# Patient Record
Sex: FEMALE | URBAN - METROPOLITAN AREA
[De-identification: names, ages, dates, MRNs, and addresses within clinical notes are randomized per-mention and may not be internally consistent; named-entity substitution may affect disease eponyms.]

---

## 2023-06-21 ENCOUNTER — APPOINTMENT (RX ONLY)
Dept: URBAN - METROPOLITAN AREA CLINIC 81 | Facility: CLINIC | Age: 28
Setting detail: DERMATOLOGY
End: 2023-06-21

## 2023-06-21 DIAGNOSIS — L40.0 PSORIASIS VULGARIS: ICD-10-CM | Status: INADEQUATELY CONTROLLED

## 2023-06-21 PROBLEM — D23.61 OTHER BENIGN NEOPLASM OF SKIN OF RIGHT UPPER LIMB, INCLUDING SHOULDER: Status: ACTIVE | Noted: 2023-06-21

## 2023-06-21 PROCEDURE — ? PRESCRIPTION

## 2023-06-21 PROCEDURE — 99204 OFFICE O/P NEW MOD 45 MIN: CPT

## 2023-06-21 PROCEDURE — ? COUNSELING

## 2023-06-21 PROCEDURE — ? PHOTO-DOCUMENTATION

## 2023-06-21 RX ORDER — TRIAMCINOLONE ACETONIDE 1 MG/G
CREAM TOPICAL
Qty: 80 | Refills: 1 | Status: ERX | COMMUNITY
Start: 2023-06-21

## 2023-06-21 RX ADMIN — TRIAMCINOLONE ACETONIDE: 1 CREAM TOPICAL at 00:00

## 2023-06-21 ASSESSMENT — LOCATION SIMPLE DESCRIPTION DERM
LOCATION SIMPLE: RIGHT ELBOW
LOCATION SIMPLE: LEFT ELBOW

## 2023-06-21 ASSESSMENT — LOCATION ZONE DERM: LOCATION ZONE: ARM

## 2023-06-21 ASSESSMENT — LOCATION DETAILED DESCRIPTION DERM
LOCATION DETAILED: RIGHT ELBOW
LOCATION DETAILED: LEFT ELBOW

## 2025-02-03 ENCOUNTER — APPOINTMENT (OUTPATIENT)
Dept: URBAN - METROPOLITAN AREA CLINIC 84 | Facility: CLINIC | Age: 30
Setting detail: DERMATOLOGY
End: 2025-02-03

## 2025-02-03 DIAGNOSIS — L40.0 PSORIASIS VULGARIS: ICD-10-CM | Status: INADEQUATELY CONTROLLED

## 2025-02-03 DIAGNOSIS — L259 CONTACT DERMATITIS AND OTHER ECZEMA, UNSPECIFIED CAUSE: ICD-10-CM | Status: INADEQUATELY CONTROLLED

## 2025-02-03 PROBLEM — L30.8 OTHER SPECIFIED DERMATITIS: Status: ACTIVE | Noted: 2025-02-03

## 2025-02-03 PROCEDURE — ? PSORIATIC EPIDEMIOLOGY SCREENING TOOL (PEST)

## 2025-02-03 PROCEDURE — ? COUNSELING

## 2025-02-03 PROCEDURE — ? PRESCRIPTION

## 2025-02-03 PROCEDURE — 99214 OFFICE O/P EST MOD 30 MIN: CPT

## 2025-02-03 PROCEDURE — ? PRESCRIPTION MEDICATION MANAGEMENT

## 2025-02-03 RX ORDER — TAPINAROF 10 MG/1000MG
CREAM TOPICAL
Qty: 60 | Refills: 6 | Status: ERX | COMMUNITY
Start: 2025-02-03

## 2025-02-03 RX ORDER — TRIAMCINOLONE ACETONIDE 1 MG/G
CREAM TOPICAL BID
Qty: 80 | Refills: 1 | Status: ERX | COMMUNITY
Start: 2025-02-03

## 2025-02-03 RX ORDER — LEVOCETIRIZINE DIHYDROCHLORIDE 5 MG/1
TABLET, FILM COATED ORAL
Qty: 30 | Refills: 1 | Status: ERX | COMMUNITY
Start: 2025-02-03

## 2025-02-03 RX ADMIN — LEVOCETIRIZINE DIHYDROCHLORIDE: 5 TABLET, FILM COATED ORAL at 00:00

## 2025-02-03 RX ADMIN — TAPINAROF: 10 CREAM TOPICAL at 00:00

## 2025-02-03 RX ADMIN — TRIAMCINOLONE ACETONIDE: 1 CREAM TOPICAL at 00:00

## 2025-02-03 ASSESSMENT — LOCATION DETAILED DESCRIPTION DERM
LOCATION DETAILED: RIGHT ELBOW
LOCATION DETAILED: RIGHT POPLITEAL SKIN
LOCATION DETAILED: LEFT MEDIAL SUPERIOR CHEST
LOCATION DETAILED: LEFT ELBOW
LOCATION DETAILED: LEFT KNEE
LOCATION DETAILED: RIGHT AXILLARY VAULT
LOCATION DETAILED: RIGHT MEDIAL FRONTAL SCALP
LOCATION DETAILED: LEFT ANTECUBITAL SKIN
LOCATION DETAILED: LEFT AXILLARY VAULT
LOCATION DETAILED: LEFT CENTRAL EYEBROW

## 2025-02-03 ASSESSMENT — LOCATION SIMPLE DESCRIPTION DERM
LOCATION SIMPLE: RIGHT AXILLARY VAULT
LOCATION SIMPLE: LEFT KNEE
LOCATION SIMPLE: CHEST
LOCATION SIMPLE: LEFT EYEBROW
LOCATION SIMPLE: RIGHT POPLITEAL SKIN
LOCATION SIMPLE: LEFT AXILLARY VAULT
LOCATION SIMPLE: LEFT ELBOW
LOCATION SIMPLE: LEFT UPPER ARM
LOCATION SIMPLE: RIGHT SCALP
LOCATION SIMPLE: RIGHT ELBOW

## 2025-02-03 ASSESSMENT — LOCATION ZONE DERM
LOCATION ZONE: AXILLAE
LOCATION ZONE: FACE
LOCATION ZONE: ARM
LOCATION ZONE: LEG
LOCATION ZONE: TRUNK
LOCATION ZONE: SCALP

## 2025-02-03 ASSESSMENT — BSA PSORIASIS: % BODY COVERED IN PSORIASIS: 5

## 2025-02-03 ASSESSMENT — ITCH NUMERIC RATING SCALE: HOW SEVERE IS YOUR ITCHING?: 6

## 2025-02-03 ASSESSMENT — PGA PSORIASIS: PGA PSORIASIS 2020: MODERATE

## 2025-02-03 NOTE — PROCEDURE: PRESCRIPTION MEDICATION MANAGEMENT
Initiate Treatment: Vtama cream Qd to face, arms, legs and sensitive areas.
Detail Level: Zone
Render In Strict Bullet Format?: No
Initiate Treatment: Triamcinolone 0.1% cream bid to arms, chest, trunk bid x 2 weeks on 2 weeks off. PRN \\nLevocetirizine 5 mg Qd.

## 2025-02-03 NOTE — PROCEDURE: PSORIATIC EPIDEMIOLOGY SCREENING TOOL (PEST)
Positive Screening Text: A score of 3 or greater is considered a positive PEST score.
Detail Level: Simple
Have You Ever Had A Swollen Joint?: No
Negative Screening Text: A score of less than 3 is considered a negative PEST score.

## 2025-02-17 ENCOUNTER — RX ONLY (RX ONLY)
Age: 30
End: 2025-02-17

## 2025-02-17 RX ORDER — ROFLUMILAST 3 MG/G
CREAM TOPICAL
Qty: 60 | Refills: 0 | Status: ERX | COMMUNITY
Start: 2025-02-17